# Patient Record
(demographics unavailable — no encounter records)

---

## 2025-01-02 NOTE — PHYSICAL EXAM
[General Appearance - Well Developed] : well developed [Normal Appearance] : normal appearance [Well Groomed] : well groomed [General Appearance - Well Nourished] : well nourished [No Deformities] : no deformities [General Appearance - In No Acute Distress] : no acute distress [] : no respiratory distress [Respiration, Rhythm And Depth] : normal respiratory rhythm and effort [Exaggerated Use Of Accessory Muscles For Inspiration] : no accessory muscle use [Auscultation Breath Sounds / Voice Sounds] : lungs were clear to auscultation bilaterally [Heart Rate And Rhythm] : heart rate and rhythm were normal [Heart Sounds] : normal S1 and S2 [Abdomen Soft] : soft [Abnormal Walk] : normal gait [Skin Color & Pigmentation] : normal skin color and pigmentation [Oriented To Time, Place, And Person] : oriented to person, place, and time [FreeTextEntry1] : No LE Edema  [Well Developed] : well developed [Well Nourished] : well nourished [No Acute Distress] : no acute distress [Normal Conjunctiva] : normal conjunctiva [Normal Venous Pressure] : normal venous pressure [No Carotid Bruit] : no carotid bruit [Normal S1, S2] : normal S1, S2 [No Murmur] : no murmur [Normal] : clear lung fields, good air entry, no respiratory distress [Clear Lung Fields] : clear lung fields [Soft] : abdomen soft [Non Tender] : non-tender [Normal Gait] : normal gait [No Edema] : no edema [No Rash] : no rash [Moves all extremities] : moves all extremities [Alert and Oriented] : alert and oriented

## 2025-01-02 NOTE — DISCUSSION/SUMMARY
[FreeTextEntry1] : Pt will stop Donepezil and sertraline.  Will obtain one-week zio and labs.  Echo done in the office today.  ECG done to evaluate for ischemia. Pt will follow up in 6 months or prn.  [EKG obtained to assist in diagnosis and management of assessed problem(s)] : EKG obtained to assist in diagnosis and management of assessed problem(s)

## 2025-01-24 NOTE — HISTORY OF PRESENT ILLNESS
[Family Member] : family member [FreeTextEntry8] : The patient was in his usual state of health until Wednesday when he developed a cough, wheeze, and SOB. The patient's daughter notes that he was exposed to her on 1/18/25, and she tested positive for COVID on Wednesday 1/22/2025. The patient also tested positive for COVID on Wednesday. There has been no sputum production. There have been no fevers, chills, or night sweats.   At this time, the patient continues to experience symptoms including a cough and wheeze. He continues to deny sputum production and hemoptysis. He reports that his appetite is good, and his weight has been stable. There has been no chest pain, palpitations, or PND. There have been no other acute constitutional symptoms. He comes in for this assessment.

## 2025-01-24 NOTE — PLAN
[FreeTextEntry1] : 1. Continue current medications as outlined above.  2. The patient will begin taking Zithromax 500 MG once daily for treatment of COVID.   3. The patient will begin taking Prednisone 20 MG BID x6 days for treatment of COVID.   4. Follow up on an as needed basis.    5. Routine medical follow-up with his primary care physician, Dr. Amor.    6. Cardiovascular exercise as tolerated.

## 2025-01-24 NOTE — REVIEW OF SYSTEMS
[Shortness Of Breath] : shortness of breath [Wheezing] : wheezing [Cough] : cough [Negative] : Heme/Lymph [FreeTextEntry6] : see hpi

## 2025-01-24 NOTE — ADDENDUM
[FreeTextEntry1] : This note was written by Sandra Martini on 01/24/2025 acting as a medical scribe for Dr. Carlos Koehler MD. All medical entries made by the scribe were at my, Dr. Carlos Koehler's, direction and personally dictated by me on 01/24/2025. I have reviewed the chart and agree that the record accurately reflects my personal performance of the history, review of systems, assessment, and plan. I have also personally directed, reviewed, and agreed with the chart.

## 2025-01-24 NOTE — PHYSICAL EXAM
[No Acute Distress] : no acute distress [Normal Oropharynx] : the oropharynx was normal [No JVD] : no jugular venous distention [Supple] : supple [No Respiratory Distress] : no respiratory distress  [No Accessory Muscle Use] : no accessory muscle use [Normal Rate] : normal rate  [Regular Rhythm] : with a regular rhythm [Normal S1, S2] : normal S1 and S2 [No Edema] : there was no peripheral edema [No Extremity Clubbing/Cyanosis] : no extremity clubbing/cyanosis [Soft] : abdomen soft [Normal Bowel Sounds] : normal bowel sounds [Normal Posterior Cervical Nodes] : no posterior cervical lymphadenopathy [Normal Anterior Cervical Nodes] : no anterior cervical lymphadenopathy [No Rash] : no rash [Normal Affect] : the affect was normal [de-identified] : There is good air entry bilaterally.  There are no focal rales or rhonchi.  A slight end expiratory wheezes noted with forced maneuvers.

## 2025-02-05 NOTE — ASSESSMENT
[FreeTextEntry1] : 90-year-old male past medical history of hypertension lipidemia CAD and AAA repair in 2006 here with lower extremity pain and blue discoloration of his hands AAA previously treated will need surveillance last CT was 2023 showed no endoleak and a small aneurysmal sac left renal stent appears to be migrated into the mid renal however patent with good flow to the left kidney Lower extremity pain likely secondary to PAD did an FADY PVR today discussed the findings with the patient and his daughter patient has noncompressible vessels in the right upper thigh consistent with a right CFA stenosis or occlusion however his FADY is 0.7 on the right on the left he has diminished waveforms beyond the thigh suggestive of SFA disease with an FADY of approximately 0.7 as well though he has significant disease he has minimal to no symptoms and I would initially start to treat with medical management Discussed starting cilostazol 100 mg twice a day discussed the risk benefits and alternatives we will attempt this medication Follow-up in 6 months with an abdominal aortic duplex and will reassess the PAD symptoms

## 2025-02-05 NOTE — PHYSICAL EXAM
[Normal Breath Sounds] : Normal breath sounds [Normal Rate and Rhythm] : normal rate and rhythm [2+] : left 2+ [0] : left 0 [JVD] : no jugular venous distention  [Ankle Swelling (On Exam)] : not present [Varicose Veins Of Lower Extremities] : not present [] : not present [de-identified] : well appearing elderly [de-identified] : wnl [FreeTextEntry1] : feet warm <2s cap refill no wounds  b/l hands hblue/purple <2s cap refill

## 2025-02-05 NOTE — HISTORY OF PRESENT ILLNESS
[FreeTextEntry1] : 89 Y/O gentleman PMH: CAD, HTN, HLD, AAA repaired 2006 in [facility] by Dr. Georges with EVAR + renal artery stent. Previously was following with Dr. Rios but was lost to follow up recenly.  Here for evaluation of 3 different pathologies first is his known and repaired AAA next is newly found or possibly worsened PAD and finally he has bilateral digit bluing that is worsened.  He recently had an episode of COVID during which she had an episode of groin and upper thigh severe pain unclear which leg the patient and his daughter cannot remember.  He had a venous duplex done which showed no DVT but showed some peripheral arterial disease.  It is unclear if this was known and longstanding he has never been fully followed or treated for this.  However there is a CT scan from several years ago which shows a significant calcified plaque in his right common femoral artery.  The patient denies any residual pain aches he is not very ambulatory but denies any claudication or rest pain.  He has no wounds and no ulcers.  He also denies any symptoms of chronic venous insufficiency such as swelling venous congestion or varicose veins. In regards to his hands the daughter states that he has had worsened bluing of the fingertips over the last several months it does not seem to change with the temperature constantly there.  She states that it is difficult to get a pulse oximetry reading on any of his digits.  He denies any numbness or tingling or pain in his digits. In regards to his AAA the last imaging that I see is a CT scan from 2023 there is no endoleak and the aneurysm is less than 5 cm in size.  He has no abdominal or back pain.  He has no other major changes.  He is compliant with all his physicians follow-ups and medications

## 2025-02-10 NOTE — HISTORY OF PRESENT ILLNESS
[FreeTextEntry8] : 90M w/ PMHx of dementia, BPH, COPD, CAD w/ stents x2, AAA repair, left renal artery stent, PVD, HTN, and HLD presents to office accompanied by his two daughters with c/o ongoing cough (nonproductive) and wheezing since being diagnosed with Covid on 1/24/25. Pt's daughter is an NP and reported wheezing on exam. CXR obtained on 2/4/25 was negative for active chest disease. No fever/chills/chest pain. Appears euvolemic on exam. Pt has been treated with Azithromycin 500mg po daily x Prednisone 20 MG BID x6 days